# Patient Record
Sex: MALE | Race: WHITE | URBAN - METROPOLITAN AREA
[De-identification: names, ages, dates, MRNs, and addresses within clinical notes are randomized per-mention and may not be internally consistent; named-entity substitution may affect disease eponyms.]

---

## 2017-01-17 ENCOUNTER — OFFICE VISIT (OUTPATIENT)
Dept: NEUROLOGY | Facility: CLINIC | Age: 72
End: 2017-01-17
Payer: COMMERCIAL

## 2017-01-17 VITALS
OXYGEN SATURATION: 97 % | WEIGHT: 205 LBS | HEART RATE: 53 BPM | SYSTOLIC BLOOD PRESSURE: 143 MMHG | BODY MASS INDEX: 27.05 KG/M2 | DIASTOLIC BLOOD PRESSURE: 84 MMHG

## 2017-01-17 DIAGNOSIS — G60.3 IDIOPATHIC PROGRESSIVE POLYNEUROPATHY: ICD-10-CM

## 2017-01-17 DIAGNOSIS — M79.2 NEUROPATHIC PAIN: Primary | ICD-10-CM

## 2017-01-17 PROCEDURE — 99213 OFFICE O/P EST LOW 20 MIN: CPT | Performed by: PSYCHIATRY & NEUROLOGY

## 2017-01-17 ASSESSMENT — PAIN SCALES - GENERAL: PAINLEVEL: MILD PAIN (2)

## 2017-01-17 NOTE — PATIENT INSTRUCTIONS
Thank you for choosing [facility]. It was a pleasure to see you for your office visit today.     The following is a summary of your office visit:    Medication started today: Nortriptyline 10mg one tab for one week then increase to 2 tabs.     Medication stopped today:NA    Medication dose change:NA    Appointments Made today:Follow up 1 month    Nurse/clinic contact information:Brunilda [phone]    Further instructions for your care:  Watch for Side effects such as dry mouth, lightheadedness, ect.  Call as needed        If you had any blood work, imaging or other test we will be in touch regarding the results. If there is anything abnormal you will be contacted by phone and if the results are normal you will receive a letter in the mail. If you have any questions or concerns please contact us at [phone].

## 2017-01-17 NOTE — MR AVS SNAPSHOT
After Visit Summary   1/17/2017    Agnus Oliver    MRN: [id number]           Patient Information     Date Of Birth          1945        Visit Information        Provider Department      1/17/2017 11:00 AM Sterling Rodriguez MD [facility]        Care Instructions    Thank you for choosing [facility]. It was a pleasure to see you for your office visit today.     The following is a summary of your office visit:    Medication started today: Nortriptyline 10mg one tab for one week then increase to 2 tabs.     Medication stopped today:NA    Medication dose change:NA    Appointments Made today:Follow up 1 month    Nurse/clinic contact information:Brunilda [phone]    Further instructions for your care:  Watch for Side effects such as dry mouth, lightheadedness, ect.  Call as needed        If you had any blood work, imaging or other test we will be in touch regarding the results. If there is anything abnormal you will be contacted by phone and if the results are normal you will receive a letter in the mail. If you have any questions or concerns please contact us at [phone].                 Follow-ups after your visit        Your next 10 appointments already scheduled     Feb 28, 2017  9:30 AM   Return Visit with Sterling Rodriguez MD   [facility]    [address] [place] [address] [phone]              Who to contact     If you have questions or need follow up information about today's clinic visit or your schedule please contact [facility] directly at [phone].  Normal or non-critical lab and imaging results will be communicated to you by MyChart, letter or phone within 4 business days after the clinic has received the results. If you do not hear from us within 7 days, please contact the clinic through MyChart or phone. If you have a critical or abnormal lab result, we  will notify you by phone as soon as possible.  Submit refill requests through [employer] or call your pharmacy and they will forward the refill request to us. Please allow 3 business days for your refill to be completed.          Additional Information About Your Visit        [employer] Information     [employer] is an electronic gateway that provides easy, online access to your medical records. With [employer], you can request a clinic appointment, read your test results, renew a prescription or communicate with your care team.     To sign up for [employer] visit the website at [email or web address]   You will be asked to enter the access code listed below, as well as some personal information. Please follow the directions to create your username and password.     Your access code is: [email or web address]  Expires: 2017 12:07 PM     Your access code will  in 90 days. If you need help or a new code, please contact your [facility] or call [phone] for assistance.        Care EveryWhere ID     This is your Care EveryWhere ID. This could be used by other organizations to access your [facility] medical records  [id number]        Your Vitals Were     Pulse Pulse Oximetry                53 97%           Blood Pressure from Last 3 Encounters:   17 143/84   11/15/16 121/77   16 132/75    Weight from Last 3 Encounters:   17 92.987 kg (205 lb)   11/15/16 90.22 kg (198 lb 14.4 oz)   16 90.629 kg (199 lb 12.8 oz)              Today, you had the following     No orders found for display         Today's Medication Changes          These changes are accurate as of: 17 11:40 AM.  If you have any questions, ask your nurse or doctor.               These medicines have changed or have updated prescriptions.        Dose/Directions    gabapentin 300 MG capsule   Commonly known as:  NEURONTIN   This may have changed:  additional instructions   Used for:  Idiopathic progressive  polyneuropathy        Take 1 capsule in the AM, 2 capsules in the afternoon and 2 capsules in the evening.   Quantity:  150 capsule   Refills:  1                Primary Care Provider Office Phone # Fax #    Margaret Siegel [phone]       [facility] [address]  [facility] [address]        Thank you!     Thank you for choosing [facility]  for your care. Our goal is always to provide you with excellent care. Hearing back from our patients is one way we can continue to improve our services. Please take a few minutes to complete the written survey that you may receive in the mail after your visit with us. Thank you!             Your Updated Medication List - Protect others around you: Learn how to safely use, store and throw away your medicines at www.disposemymeds.org.          This list is accurate as of: 1/17/17 11:40 AM.  Always use your most recent med list.                   Brand Name Dispense Instructions for use    ascorbic acid 500 MG tablet    VITAMIN C     Take 500 mg by mouth daily.       EXCEDRIN PO      Take by mouth as needed       FISH OIL PO      Take 1 tablet by mouth daily. Capsules. Dosage not documented.       gabapentin 300 MG capsule    NEURONTIN    150 capsule    Take 1 capsule in the AM, 2 capsules in the afternoon and 2 capsules in the evening.       IBU-200 PO      Take 2 tablets by mouth as needed.       MULTIvitamin  S Caps      Take 1 tablet by mouth daily.       traMADol 50 MG tablet    ULTRAM    360 tablet    Take 1 tablet in the morning, 1 in the afternoon and 2 in the evening.       vitamin E 400 UNIT capsule      Take 1 capsule by mouth daily.

## 2017-01-17 NOTE — NURSING NOTE
"Angus Oliver's goals for this visit include: return  He requests these members of his care team be copied on today's visit information:     PCP: Margaret Siegel    Referring Provider:  No referring provider defined for this encounter.    Chief Complaint   Patient presents with     RECHECK       Initial /84 mmHg  Pulse 53  Wt 92.987 kg (205 lb)  SpO2 97% Estimated body mass index is 27.05 kg/(m^2) as calculated from the following:    Height as of 11/15/16: 1.854 m (6' 1\").    Weight as of this encounter: 92.987 kg (205 lb).  BP completed using cuff size: large    Do you need any medication refills at today's visit? y  "

## 2017-01-17 NOTE — Clinical Note
January 17, 2017      RE: Angus Oliver  [address] [place]  [place] [address]      No notes on file    Sterling Rodriguez MD

## 2017-01-18 NOTE — PROGRESS NOTES
2017            [place] [address]      RE:  Angus Oliver   MRN:  [id number]   :  1945      Dear Dr. Siegel:      I saw Angus Oliver in followup at the [facility] where I have seen him previously for management of an idiopathic sensory motor polyneuropathy.  Mr. Oliver notices a greater degree of subjective stiffness in the lower limbs and burning in the lower limbs in the evening.  He also is concerned that there might be a modest increase in numbness in all fingers of both digits.  He has no other new symptoms.  Increasing his gabapentin did not make any obvious benefit in his symptoms.      PHYSICAL EXAMINATION:  Speech, language and affect are normal.  Perception of light touch is preserved.  Pinprick perception is reduced over the distal half of the right foot and is otherwise generally preserved.  Vibration scores are 4-5 at the patellae, 0 at the toes and malleoli and 6 at the right index finger.  He is areflexic.  Manual muscle testing demonstrates trace weakness of right FDI with otherwise full strength.  There is mild symmetric atrophy of intrinsic foot muscles with mildly high arches.  He walks independently.  There are no parkinsonian features in his gait.  There is not an increase in tone and he is not retropulsive.      ASSESSMENT AND PLAN:  Mr. Oliver's examination demonstrates no substantial changes.  We discussed the option of further laboratory screening for causes of sensory deficits, but this has been negative in the past and likely the yield is low.  He prefers not to undergo further laboratory testing.      We spent some time reviewing his medication history.  He may have been least symptomatic when on tramadol 200 mg total per day, nortriptyline 25 mg at bedtime and a low dose of gabapentin.  We have decided to reinstitute nortriptyline.   The dosage would be quite low and the risk of clinically symptomatic serotonin excess is exceedingly modest, particularly given the fact that he has tolerated these doses in the past.  I will start him on 10 mg in the evening and have him go to 20 mg after 1 week if tolerated.  We reviewed adverse effects to monitor on this medication.  Should he do well on this regimen, we will begin to taper gabapentin over time.         Sincerely,      MARIBELL CUADRA MD             D: 2017 11:36   T: 2017 12:43   MT:       Name:     MANPREET FELDMAN   MRN:      [id number]        Account:      [id number]   :      1945      Document: [id number]       cc: Margaret Siegel MD

## 2017-01-27 ENCOUNTER — TELEPHONE (OUTPATIENT)
Dept: NEUROLOGY | Facility: CLINIC | Age: 72
End: 2017-01-27

## 2017-01-27 DIAGNOSIS — G60.3 IDIOPATHIC PROGRESSIVE POLYNEUROPATHY: Primary | ICD-10-CM

## 2017-01-27 RX ORDER — NORTRIPTYLINE HCL 10 MG
CAPSULE ORAL
Qty: 60 CAPSULE | Refills: 0 | Status: SHIPPED | OUTPATIENT
Start: 2017-01-27 | End: 2017-02-28

## 2017-01-27 NOTE — TELEPHONE ENCOUNTER
Per the notes stated in Dr Rodriguez's last office visit note, the prescription is pending in  for Dr Rodriguez to review and sign.   Brunilda Ledesma RN

## 2017-01-27 NOTE — TELEPHONE ENCOUNTER
[facility] Call Center    Phone Message    Name of Caller: Angus    Phone Number: Home number on file [phone] (home)    Best time to return call: Any    May a detailed message be left on voicemail: yes    Relation to patient: Self    Reason for Call: Angus called and said after his last visit he was supposed to receive a prescription for Nortriptyline.  He said he has checked with the pharmacy and they have not received a prescription.  Preferred Pharmacy: [facility]  Fax: [phone].      Action Taken: Message routed to:  Adult Clinics: Neurology p [id number]

## 2017-02-03 RX ORDER — NORTRIPTYLINE HCL 10 MG
CAPSULE ORAL
Qty: 180 CAPSULE | Refills: 0 | Status: CANCELLED | OUTPATIENT
Start: 2017-02-03

## 2017-02-03 NOTE — TELEPHONE ENCOUNTER
Since we are titrating the dose upward and do not yet know what dose will be best, perhaps we should hold off until we know more.

## 2017-02-03 NOTE — TELEPHONE ENCOUNTER
Notification received from [facility] requesting a 3 month supply of the Nortriptyline. Pending RX routed to Dr Rodriguez to review and sign.  Brunilda Ledesma RN

## 2017-02-27 NOTE — TELEPHONE ENCOUNTER
The pt was informed of Dr Rodriguez's response below. He stated that he doesn't feel this is helping very much and will discuss with Dr Rodriguez tomorrow in the office visit.  Brunilda Ledesma RN

## 2017-02-28 ENCOUNTER — OFFICE VISIT (OUTPATIENT)
Dept: NEUROLOGY | Facility: CLINIC | Age: 72
End: 2017-02-28
Payer: COMMERCIAL

## 2017-02-28 VITALS
BODY MASS INDEX: 27.86 KG/M2 | SYSTOLIC BLOOD PRESSURE: 128 MMHG | DIASTOLIC BLOOD PRESSURE: 81 MMHG | HEART RATE: 60 BPM | OXYGEN SATURATION: 97 % | WEIGHT: 211.2 LBS

## 2017-02-28 DIAGNOSIS — Z13.1 ENCOUNTER FOR SCREENING FOR DIABETES MELLITUS: ICD-10-CM

## 2017-02-28 DIAGNOSIS — G62.9 POLYNEUROPATHY: ICD-10-CM

## 2017-02-28 DIAGNOSIS — G60.3 IDIOPATHIC PROGRESSIVE POLYNEUROPATHY: ICD-10-CM

## 2017-02-28 PROCEDURE — 99212 OFFICE O/P EST SF 10 MIN: CPT | Performed by: PSYCHIATRY & NEUROLOGY

## 2017-02-28 RX ORDER — LIDOCAINE 50 MG/G
PATCH TOPICAL
Qty: 30 PATCH | Refills: 0 | Status: SHIPPED | OUTPATIENT
Start: 2017-02-28 | End: 2017-04-18

## 2017-02-28 RX ORDER — NORTRIPTYLINE HCL 25 MG
CAPSULE ORAL
Qty: 60 CAPSULE | Refills: 3 | Status: SHIPPED
Start: 2017-02-28 | End: 2017-05-24

## 2017-02-28 RX ORDER — GABAPENTIN 300 MG/1
600 CAPSULE ORAL 3 TIMES DAILY
Qty: 180 CAPSULE | Refills: 3 | Status: SHIPPED | OUTPATIENT
Start: 2017-02-28 | End: 2017-02-28

## 2017-02-28 RX ORDER — GABAPENTIN 300 MG/1
600 CAPSULE ORAL 3 TIMES DAILY
Qty: 540 CAPSULE | Refills: 0 | Status: SHIPPED | OUTPATIENT
Start: 2017-02-28 | End: 2017-04-18

## 2017-02-28 ASSESSMENT — PAIN SCALES - GENERAL: PAINLEVEL: MILD PAIN (2)

## 2017-02-28 NOTE — NURSING NOTE
"Angus Oliver's goals for this visit include: return  He requests these members of his care team be copied on today's visit information:     PCP: Margaret Siegel    Referring Provider:  No referring provider defined for this encounter.    Chief Complaint   Patient presents with     RECHECK       Initial /81 (BP Location: Left arm, Patient Position: Chair, Cuff Size: Adult Large)  Pulse 60  Wt 95.8 kg (211 lb 3.2 oz)  SpO2 97%  BMI 27.86 kg/m2 Estimated body mass index is 27.86 kg/(m^2) as calculated from the following:    Height as of 11/15/16: 1.854 m (6' 1\").    Weight as of this encounter: 95.8 kg (211 lb 3.2 oz).  Medication Reconciliation: complete    Do you need any medication refills at today's visit? y  "

## 2017-02-28 NOTE — LETTER
2017      RE: Angus Feldman  [address] [facility] [place] [address]      [id number]    2017            Margaret Siegel MD   [address] [address]      RE:  Angus Feldman   MRN:  [id number]   :  1945      Dear Dr. Siegel:      I saw Angus Feldman in followup at the [facility], where I have seen him for evaluation of an idiopathic painful neuropathy.  He reports modest benefit on nortriptyline without adverse effects.  We will increase his dose to 25 mg for 1 week and then 50 mg if tolerated.  I encouraged him to reduce his tramadol dose if this provides added benefit.  We discussed adverse effects of nortriptyline, as well as symptoms of serotonin syndrome to watch for.  I explained that he should reduce dose and call should he develop any of these symptoms.  I will start him on Lidoderm patches to be used as needed at night as well.  He will return in 4-6 months.         Sincerely,      MARIBELL CUADRA MD             D: 2017 11:13   T: 2017 13:33   MT:       Name:     ANGUS FELDMAN   MRN:      [id number]        Account:      [id number]   :      1945      Document: [id number]       cc: Margaret Cuadra MD

## 2017-02-28 NOTE — MR AVS SNAPSHOT
After Visit Summary   2/28/2017    Angus Oliver    MRN: [id number]           Patient Information     Date Of Birth          1945        Visit Information        Provider Department      2/28/2017 9:30 AM Sterling Rodriguez MD [facility]        Today's Diagnoses     Idiopathic progressive polyneuropathy        Polyneuropathy (H)         Encounter for screening for diabetes mellitus           Care Instructions    Thank you for choosing [facility] in [place]. It was a pleasure to see you for your office visit today.     The following is a summary of your office visit:    Medication started today: Lidoderm Patches, apply to painful areas once daily. You may keep them on for up to 12 hours but need to be off for 12 hours. Apply to intact skin only.     Medication stopped today: None    Medication dose change: Nortriptyline, take 1 at bedtime for 1 week, then 2 at bedtime. Reduce dose and call for dizziness, drowsiness, tremor, anxiety, or other new adverse effects.    Appointments Made today: A 6 week follow up with Dr Rodriguez was made for you.     Nurse/clinic contact information: [facility] [phone]    Further instructions for your care:   1. Call if your symptoms change or worsen.   2. Have the glucose tolerance test completed at your local clinic.        Follow-ups after your visit        Your next 10 appointments already scheduled     Apr 18, 2017  2:00 PM CDT   Return Visit with Sterling Rodriguez MD   [facility] ([facility]    [address] [place] [address] [phone]              Future tests that were ordered for you today     Open Future Orders        Priority Expected Expires Ordered    Glucose tolerance std non preg Routine  2/28/2018 2/28/2017            Who to contact     If you have questions or need follow up information about today's clinic visit or your schedule please  contact [facility] directly at [phone].  Normal or non-critical lab and imaging results will be communicated to you by MyChart, letter or phone within 4 business days after the clinic has received the results. If you do not hear from us within 7 days, please contact the clinic through MyChart or phone. If you have a critical or abnormal lab result, we will notify you by phone as soon as possible.  Submit refill requests through [employer] or call your pharmacy and they will forward the refill request to us. Please allow 3 business days for your refill to be completed.          Additional Information About Your Visit        [employer] Information     [employer] is an electronic gateway that provides easy, online access to your medical records. With [employer], you can request a clinic appointment, read your test results, renew a prescription or communicate with your care team.     To sign up for [employer] visit the website at [email or web address]   You will be asked to enter the access code listed below, as well as some personal information. Please follow the directions to create your username and password.     Your access code is: [email or web address]  Expires: 2017 11:22 AM     Your access code will  in 90 days. If you need help or a new code, please contact your [facility] or call [phone] for assistance.        Care EveryWhere ID     This is your Care EveryWhere ID. This could be used by other organizations to access your [facility] medical records  [id number]        Your Vitals Were     Pulse Pulse Oximetry BMI (Body Mass Index)             60 97% 27.86 kg/m2          Blood Pressure from Last 3 Encounters:   17 128/81   17 143/84   11/15/16 121/77    Weight from Last 3 Encounters:   17 211 lb 3.2 oz (95.8 kg)   17 205 lb (93 kg)   11/15/16 198 lb 14.4 oz (90.2 kg)                 Today's Medication Changes          These changes are  accurate as of: 2/28/17 11:22 AM.  If you have any questions, ask your nurse or doctor.               Start taking these medicines.        Dose/Directions    lidocaine 5 % Patch   Commonly known as:  LIDODERM   Used for:  Idiopathic progressive polyneuropathy   Started by:  Sterling Rodriguez MD        Apply 1 patch to each foot nightly.   Quantity:  30 patch   Refills:  0         These medicines have changed or have updated prescriptions.        Dose/Directions    * gabapentin 300 MG capsule   Commonly known as:  NEURONTIN   This may have changed:  additional instructions   Used for:  Idiopathic progressive polyneuropathy   Changed by:  Sterling Rodriguez MD        Take 1 capsule in the AM, 2 capsules in the afternoon and 2 capsules in the evening.   Quantity:  150 capsule   Refills:  1       * gabapentin 300 MG capsule   Commonly known as:  NEURONTIN   This may have changed:  You were already taking a medication with the same name, and this prescription was added. Make sure you understand how and when to take each.   Used for:  Idiopathic progressive polyneuropathy   Changed by:  Sterling Rodriguez MD        Dose:  600 mg   Take 2 capsules (600 mg) by mouth 3 times daily   Quantity:  180 capsule   Refills:  3       * nortriptyline 10 MG capsule   Commonly known as:  PAMELOR   This may have changed:    - how much to take  - how to take this  - when to take this  - additional instructions   Used for:  Idiopathic progressive polyneuropathy   Changed by:  Sterling Rodriguez MD        Start by taking 1 tablet by mouth every evening for 2 weeks and then if tolerated increase to 2 tabs in the evening   Quantity:  60 capsule   Refills:  0       * nortriptyline 25 MG capsule   Commonly known as:  PAMELOR   This may have changed:  You were already taking a medication with the same name, and this prescription was added. Make sure you understand how and when to take each.   Used for:  Idiopathic progressive polyneuropathy   Changed by:  Sterling Rodriguez  MD        Take 1 at bedtime for 1 week, then 2 at bedtime. Reduce dose and call for dizziness, drowsiness, tremor, anxiety, or other new adverse effects.   Quantity:  60 capsule   Refills:  3       * Notice:  This list has 4 medication(s) that are the same as other medications prescribed for you. Read the directions carefully, and ask your doctor or other care provider to review them with you.         Where to get your medicines      These medications were sent to [facility] - [place], [place] - [address] [facility]  [address] [facility], [place] [address]     Phone:  [phone]     gabapentin 300 MG capsule    nortriptyline 25 MG capsule         Call your pharmacy to confirm that your medication is ready for pickup. It may take up to 24 hours for them to receive the prescription. If the prescription is not ready within 3 business days, please contact your clinic or your provider.     We will let you know when these medications are ready. If you don't hear back within 3 business days, please contact us.     lidocaine 5 % Patch                Primary Care Provider Office Phone # Fax #    Margaret Siegel [phone]       [address] [address]        Thank you!     Thank you for choosing [facility]  for your care. Our goal is always to provide you with excellent care. Hearing back from our patients is one way we can continue to improve our services. Please take a few minutes to complete the written survey that you may receive in the mail after your visit with us. Thank you!             Your Updated Medication List - Protect others around you: Learn how to safely use, store and throw away your medicines at www.disposemymeds.org.          This list is accurate as of: 2/28/17 11:22 AM.  Always use your most recent med list.                   Brand Name Dispense Instructions for use    ascorbic acid 500 MG tablet    VITAMIN C      Take 500 mg by mouth daily.       EXCEDRIN PO      Take by mouth as needed       FISH OIL PO      Take 1 tablet by mouth daily. Capsules. Dosage not documented.       * gabapentin 300 MG capsule    NEURONTIN    150 capsule    Take 1 capsule in the AM, 2 capsules in the afternoon and 2 capsules in the evening.       * gabapentin 300 MG capsule    NEURONTIN    180 capsule    Take 2 capsules (600 mg) by mouth 3 times daily       IBU-200 PO      Take 2 tablets by mouth as needed.       lidocaine 5 % Patch    LIDODERM    30 patch    Apply 1 patch to each foot nightly.       MULTIvitamin  S Caps      Take 1 tablet by mouth daily.       * nortriptyline 10 MG capsule    PAMELOR    60 capsule    Start by taking 1 tablet by mouth every evening for 2 weeks and then if tolerated increase to 2 tabs in the evening       * nortriptyline 25 MG capsule    PAMELOR    60 capsule    Take 1 at bedtime for 1 week, then 2 at bedtime. Reduce dose and call for dizziness, drowsiness, tremor, anxiety, or other new adverse effects.       traMADol 50 MG tablet    ULTRAM    360 tablet    Take 1 tablet in the morning, 1 in the afternoon and 2 in the evening.       vitamin E 400 UNIT capsule      Take 1 capsule by mouth daily.       * Notice:  This list has 4 medication(s) that are the same as other medications prescribed for you. Read the directions carefully, and ask your doctor or other care provider to review them with you.

## 2017-02-28 NOTE — PATIENT INSTRUCTIONS
Thank you for choosing [facility] in [place]. It was a pleasure to see you for your office visit today.     The following is a summary of your office visit:    Medication started today: Lidoderm Patches, apply to painful areas once daily. You may keep them on for up to 12 hours but need to be off for 12 hours. Apply to intact skin only.     Medication stopped today: None    Medication dose change: Nortriptyline, take 1 at bedtime for 1 week, then 2 at bedtime. Reduce dose and call for dizziness, drowsiness, tremor, anxiety, or other new adverse effects.    Appointments Made today: A 6 week follow up with Dr Rodriguez was made for you.     Nurse/clinic contact information: [facility] [phone]    Further instructions for your care:   1. Call if your symptoms change or worsen.   2. Have the glucose tolerance test completed at your local clinic.

## 2017-02-28 NOTE — PROGRESS NOTES
2017            [place] [address]      RE:  Angus Oliver   MRN:  [id number]   :  1945      Dear Dr. Siegel:      I saw Angus Oliver in followup at the [facility], where I have seen him for evaluation of an idiopathic painful neuropathy.  He reports modest benefit on nortriptyline without adverse effects.  We will increase his dose to 25 mg for 1 week and then 50 mg if tolerated.  I encouraged him to reduce his tramadol dose if this provides added benefit.  We discussed adverse effects of nortriptyline, as well as symptoms of serotonin syndrome to watch for.  I explained that he should reduce dose and call should he develop any of these symptoms.  I will start him on Lidoderm patches to be used as needed at night as well.  He will return in 4-6 months.         Sincerely,      MARIBELL CUADRA MD             D: 2017 11:13   T: 2017 13:33   MT:       Name:     ANGUS OLIVER   MRN:      [id number]        Account:      [id number]   :      1945      Document: [id number]       cc: Margaret Siegel MD

## 2017-03-09 DIAGNOSIS — G60.3 IDIOPATHIC PROGRESSIVE POLYNEUROPATHY: Primary | ICD-10-CM

## 2017-04-18 ENCOUNTER — OFFICE VISIT (OUTPATIENT)
Dept: NEUROLOGY | Facility: CLINIC | Age: 72
End: 2017-04-18
Payer: COMMERCIAL

## 2017-04-18 VITALS
BODY MASS INDEX: 27.01 KG/M2 | DIASTOLIC BLOOD PRESSURE: 83 MMHG | OXYGEN SATURATION: 97 % | WEIGHT: 204.7 LBS | SYSTOLIC BLOOD PRESSURE: 135 MMHG | HEART RATE: 59 BPM

## 2017-04-18 DIAGNOSIS — M79.2 NEUROPATHIC PAIN: ICD-10-CM

## 2017-04-18 DIAGNOSIS — G60.3 IDIOPATHIC PROGRESSIVE POLYNEUROPATHY: ICD-10-CM

## 2017-04-18 PROCEDURE — 99212 OFFICE O/P EST SF 10 MIN: CPT | Performed by: PSYCHIATRY & NEUROLOGY

## 2017-04-18 RX ORDER — GABAPENTIN 300 MG/1
600 CAPSULE ORAL 3 TIMES DAILY
Qty: 540 CAPSULE | Refills: 0 | Status: SHIPPED | OUTPATIENT
Start: 2017-04-18 | End: 2017-08-25

## 2017-04-18 RX ORDER — TRAMADOL HYDROCHLORIDE 50 MG/1
TABLET ORAL
Qty: 360 TABLET | Refills: 1 | Status: SHIPPED | OUTPATIENT
Start: 2017-04-18 | End: 2017-09-18

## 2017-04-18 ASSESSMENT — PAIN SCALES - GENERAL: PAINLEVEL: MODERATE PAIN (4)

## 2017-04-18 NOTE — MR AVS SNAPSHOT
After Visit Summary   4/18/2017    Angus Oliver    MRN: [id number]           Patient Information     Date Of Birth          1945        Visit Information        Provider Department      4/18/2017 2:00 PM Sterling Rodriguez MD [facility]        Today's Diagnoses     Idiopathic progressive polyneuropathy        Neuropathic pain          Care Instructions    Thank you for choosing [facility]. It was a pleasure to see you for your office visit today.     The following is a summary of your office visit:    Medication started today: NA    Medication stopped today:NA    Medication dose change:NA    Appointments Made today:4 month follow up    Nurse/clinic contact information:Brunilda [phone]    Further instructions for your care:call as needed        If you had any blood work, imaging or other test we will be in touch regarding the results. If there is anything abnormal you will be contacted by phone and if the results are normal you will receive a letter in the mail. If you have any questions or concerns please contact us at [phone].                 Follow-ups after your visit        Your next 10 appointments already scheduled     Aug 29, 2017 11:30 AM CDT   Return Visit with Sterling Rodriguez MD   [facility] ([facility])    [address] [place] [address] [phone]              Who to contact     If you have questions or need follow up information about today's clinic visit or your schedule please contact [facility] directly at [phone].  Normal or non-critical lab and imaging results will be communicated to you by MyChart, letter or phone within 4 business days after the clinic has received the results. If you do not hear from us within 7 days, please contact the clinic through MyChart or phone. If you have a critical or abnormal lab result, we will notify you by phone as soon as  possible.  Submit refill requests through GetIntent or call your pharmacy and they will forward the refill request to us. Please allow 3 business days for your refill to be completed.          Additional Information About Your Visit        GetIntent Information     GetIntent is an electronic gateway that provides easy, online access to your medical records. With GetIntent, you can request a clinic appointment, read your test results, renew a prescription or communicate with your care team.     To sign up for GetIntent visit the website at [email or web address]   You will be asked to enter the access code listed below, as well as some personal information. Please follow the directions to create your username and password.     Your access code is: [email or web address]  Expires: 2017 12:22 PM     Your access code will  in 90 days. If you need help or a new code, please contact your [facility] or call [phone] for assistance.        Care EveryWhere ID     This is your Care EveryWhere ID. This could be used by other organizations to access your [place] medical records  [id number]        Your Vitals Were     Pulse Pulse Oximetry BMI (Body Mass Index)             59 97% 27.01 kg/m2          Blood Pressure from Last 3 Encounters:   17 135/83   17 128/81   17 143/84    Weight from Last 3 Encounters:   17 92.9 kg (204 lb 11.2 oz)   17 95.8 kg (211 lb 3.2 oz)   17 93 kg (205 lb)              Today, you had the following     No orders found for display         Today's Medication Changes          These changes are accurate as of: 17  2:29 PM.  If you have any questions, ask your nurse or doctor.               These medicines have changed or have updated prescriptions.        Dose/Directions    nortriptyline 25 MG capsule   Commonly known as:  PAMELOR   This may have changed:    - how much to take  - how to take this  - when to take this  - additional  instructions   Used for:  Idiopathic progressive polyneuropathy        Take 1 at bedtime for 1 week, then 2 at bedtime. Reduce dose and call for dizziness, drowsiness, tremor, anxiety, or other new adverse effects.   Quantity:  60 capsule   Refills:  3            Where to get your medicines      These medications were sent to [facility] - [place], [place] - [address]  [address] [facility] [address], [place] [address]     Phone:  [phone]     gabapentin 300 MG capsule         Some of these will need a paper prescription and others can be bought over the counter.  Ask your nurse if you have questions.     Bring a paper prescription for each of these medications     traMADol 50 MG tablet                Primary Care Provider Office Phone # Fax #    Margaret Siegel [phone]       [facility] [address]  [facility] [address]        Thank you!     Thank you for choosing [facility]  for your care. Our goal is always to provide you with excellent care. Hearing back from our patients is one way we can continue to improve our services. Please take a few minutes to complete the written survey that you may receive in the mail after your visit with us. Thank you!             Your Updated Medication List - Protect others around you: Learn how to safely use, store and throw away your medicines at www.disposemymeds.org.          This list is accurate as of: 4/18/17  2:29 PM.  Always use your most recent med list.                   Brand Name Dispense Instructions for use    ascorbic acid 500 MG tablet    VITAMIN C     Take 500 mg by mouth daily.       EXCEDRIN PO      Take by mouth as needed       FISH OIL PO      Take 1 tablet by mouth daily. Capsules. Dosage not documented.       gabapentin 300 MG capsule    NEURONTIN    540 capsule    Take 2 capsules (600 mg) by mouth 3 times daily       IBU-200 PO      Take 2 tablets by mouth as needed.        Lidocaine-Menthol 4-5 % Ptch      OTC prn       MULTIvitamin  S Caps      Take 1 tablet by mouth daily.       nortriptyline 25 MG capsule    PAMELOR    60 capsule    Take 1 at bedtime for 1 week, then 2 at bedtime. Reduce dose and call for dizziness, drowsiness, tremor, anxiety, or other new adverse effects.       traMADol 50 MG tablet    ULTRAM    360 tablet    Take 1 tablet in the morning, 1 in the afternoon and 2 in the evening.       vitamin E 400 UNIT capsule      Take 1 capsule by mouth daily.

## 2017-04-18 NOTE — PATIENT INSTRUCTIONS
Thank you for choosing [facility]. It was a pleasure to see you for your office visit today.     The following is a summary of your office visit:    Medication started today: NA    Medication stopped today:NA    Medication dose change:NA    Appointments Made today:4 month follow up    Nurse/clinic contact information:Brunilda [phone]    Further instructions for your care:call as needed        If you had any blood work, imaging or other test we will be in touch regarding the results. If there is anything abnormal you will be contacted by phone and if the results are normal you will receive a letter in the mail. If you have any questions or concerns please contact us at [phone].

## 2017-04-19 NOTE — PROGRESS NOTES
2017            Margaret Siegel MD   [facility]   [address] [place] [address]      RE:  Angus Oliver   MRN:  [id number]   :  1945      Dear Dr. Siegel:      I saw Angus Oliver in follow-up at the [facility] in [place] where I have seen him for management of an idiopathic painful neuropathy.  Mr. Oliver's disturbing symptoms in the feet have improved on nortriptyline 50 mg daily.  He does notice some reduced tactile perception in the hands which has affected fine motor control.      Examination:  Tone, bulk and strength are normal.  Reflexes are absent except for 1+ reflex at the right biceps.  Sensory examination reveals broadly preserved perception of touch, and equivocal reduction in pin over the foot dorsa.  Vibration scores are 6 at the patellae and 5 at the fingertips.  Position sense is preserved at the toes.  Gait is normal.      Mr. Oliver has a postural tremor which he indicates he has had for quite some time, including prior to initiation of nortriptyline.  He does not have inducible clonus, increased tone, or other features of serotonin excess.      I will make no changes to Mr. Oliver's medication regimen.  I suggested nerve conduction studies of the upper limbs to better assess his sensory deficits, and to evaluate for the possibility of a new entrapment neuropathy, but he declines.  I will see him in routine follow-up in 4 months or later, and will see him periodically just to monitor his clinical findings.         Sincerely,      MARIBELL CUADRA MD             D: 2017 14:25   T: 2017 03:49   MT: [id number]      Name:     ANGUS OLIVER   MRN:      [id number]        Account:      [id number]   :      1945      Document: [id number]       cc: Margaret Siegel MD

## 2017-05-24 DIAGNOSIS — G60.3 IDIOPATHIC PROGRESSIVE POLYNEUROPATHY: ICD-10-CM

## 2017-05-24 RX ORDER — NORTRIPTYLINE HCL 25 MG
CAPSULE ORAL
Qty: 60 CAPSULE | Refills: 3 | Status: SHIPPED | OUTPATIENT
Start: 2017-05-24 | End: 2017-08-25

## 2017-08-25 DIAGNOSIS — G60.3 IDIOPATHIC PROGRESSIVE POLYNEUROPATHY: ICD-10-CM

## 2017-08-25 RX ORDER — GABAPENTIN 300 MG/1
600 CAPSULE ORAL 3 TIMES DAILY
Qty: 540 CAPSULE | Refills: 1 | Status: SHIPPED | OUTPATIENT
Start: 2017-08-25 | End: 2018-03-20

## 2017-08-25 RX ORDER — NORTRIPTYLINE HCL 25 MG
CAPSULE ORAL
Qty: 60 CAPSULE | Refills: 1 | Status: SHIPPED | OUTPATIENT
Start: 2017-08-25 | End: 2017-09-07

## 2017-08-29 ENCOUNTER — OFFICE VISIT (OUTPATIENT)
Dept: NEUROLOGY | Facility: CLINIC | Age: 72
End: 2017-08-29
Payer: COMMERCIAL

## 2017-08-29 VITALS
HEART RATE: 60 BPM | BODY MASS INDEX: 26.9 KG/M2 | WEIGHT: 203 LBS | TEMPERATURE: 96.9 F | DIASTOLIC BLOOD PRESSURE: 80 MMHG | HEIGHT: 73 IN | SYSTOLIC BLOOD PRESSURE: 131 MMHG

## 2017-08-29 DIAGNOSIS — G60.3 IDIOPATHIC PROGRESSIVE POLYNEUROPATHY: Primary | ICD-10-CM

## 2017-08-29 PROCEDURE — 99212 OFFICE O/P EST SF 10 MIN: CPT | Performed by: PSYCHIATRY & NEUROLOGY

## 2017-08-29 ASSESSMENT — PAIN SCALES - GENERAL: PAINLEVEL: MILD PAIN (3)

## 2017-08-29 NOTE — LETTER
2017        RE: Manpreet Feldman  [address]  [place] [address]        [id number]    2017         Margaret Siegel MD   [facility]   [address] [place] [address]      RE:  Manpreet Feldman, MR# [id number],  1945      Dear Dr. Siegel,      I saw Manpreet Feldman in follow-up at the [facility] in [place] where I have seen him for management of an idiopathic painful sensory neuropathy.  Mr. Feldman notes no changes in his subjective symptoms, either positive or negative, and continues to feel the 50 mg of nortriptyline at bedtime has provided benefit in terms of his ability to sleep and, consequently, his pain.      A directed examination demonstrates vibration scores of 3-4 at the patellae.  Position sense is preserved at the right great toe.  Perception of light touch is within broad normal limits.  Strength is full.  Reflexes are absent and plantar responses are mute.  Pinprick perception is variably reduced in the calves and feet, with relative preservation of thermal perception.      Mr. Feldman's examination is stable compared with his last visit with the exception of a broader area of subjective pinprick sensory deficit.  We discussed this somewhat.  I am not confident that there has been a substantial change, particularly given the variability in the pinprick perception and the preserved thermal perception.  I will see him in follow-up in 4-6 months, and have encouraged him to contact me should he observe a subjective deterioration in a shorter interval, under which circumstances I might repeat his nerve conduction studies.         Sincerely,      MARIBELL CUADRA MD             D: 2017 11:59   T: 2017 00:43   MT: EM#[id number]      Name:     MANPREET FELDMAN   MRN:      [id number]        Account:      [id number]   :      1945      Document: [id number]       cc: Margaret  Christal EDMONDSON         Sincerely,        Sterling Rodriguez MD

## 2017-08-29 NOTE — MR AVS SNAPSHOT
After Visit Summary   2017    Angus Oliver    MRN: [id number]           Patient Information     Date Of Birth          1945        Visit Information        Provider Department      2017 11:30 AM Sterling Rodriguez MD [facility]         Follow-ups after your visit        Your next 10 appointments already scheduled     2018 11:30 AM CST   Return Visit with Sterling Rodriguez MD   [facility] ([facility])    [address] [place] [address] [phone]              Who to contact     If you have questions or need follow up information about today's clinic visit or your schedule please contact [facility] directly at [phone].  Normal or non-critical lab and imaging results will be communicated to you by MyChart, letter or phone within 4 business days after the clinic has received the results. If you do not hear from us within 7 days, please contact the clinic through MyChart or phone. If you have a critical or abnormal lab result, we will notify you by phone as soon as possible.  Submit refill requests through Razient or call your pharmacy and they will forward the refill request to us. Please allow 3 business days for your refill to be completed.          Additional Information About Your Visit        [employer] Information     Razient is an electronic gateway that provides easy, online access to your medical records. With Razient, you can request a clinic appointment, read your test results, renew a prescription or communicate with your care team.     To sign up for Razient visit the website at [email or web address]   You will be asked to enter the access code listed below, as well as some personal information. Please follow the directions to create your username and password.     Your access code is: [email or web address]  Expires: 2017 12:03 PM     Your access code will  in 90 days. If you  "need help or a new code, please contact your [facility] or call [phone] for assistance.        Care EveryWhere ID     This is your Care EveryWhere ID. This could be used by other organizations to access your [place] medical records  [id number]        Your Vitals Were     Pulse Temperature Height BMI (Body Mass Index)          60 96.9  F (36.1  C) (Oral) 1.854 m (6' 1\") 26.78 kg/m2         Blood Pressure from Last 3 Encounters:   08/29/17 131/80   04/18/17 135/83   02/28/17 128/81    Weight from Last 3 Encounters:   08/29/17 92.1 kg (203 lb)   04/18/17 92.9 kg (204 lb 11.2 oz)   02/28/17 95.8 kg (211 lb 3.2 oz)              Today, you had the following     No orders found for display       Primary Care Provider Office Phone # Fax #    Margaret DARIUS Christal [phone]       [address] [address]        Equal Access to Services     [facility] Center[facility] : Hadii aad ku hadasho Soomaali, waaxda luqadaha, qaybta kaalmada adeegyada, bang stark . So [facility] [phone].    ATENCIÓN: Si habla español, tiene a cheney disposición servicios gratuitos de asistencia lingüística. Adriano al [phone].    We comply with applicable federal civil rights laws and [place] laws. We do not discriminate on the basis of race, color, national origin, age, disability sex, sexual orientation or gender identity.            Thank you!     Thank you for choosing [facility]  for your care. Our goal is always to provide you with excellent care. Hearing back from our patients is one way we can continue to improve our services. Please take a few minutes to complete the written survey that you may receive in the mail after your visit with us. Thank you!             Your Updated Medication List - Protect others around you: Learn how to safely use, store and throw away your medicines at www.disposemymeds.org.          This list is " accurate as of: 8/29/17 12:03 PM.  Always use your most recent med list.                   Brand Name Dispense Instructions for use Diagnosis    ascorbic acid 500 MG tablet    VITAMIN C     Take 500 mg by mouth daily.        EXCEDRIN PO      Take by mouth as needed        FISH OIL PO      Take 1 tablet by mouth daily. Capsules. Dosage not documented.        gabapentin 300 MG capsule    NEURONTIN    540 capsule    Take 2 capsules (600 mg) by mouth 3 times daily    Idiopathic progressive polyneuropathy       IBU-200 PO      Take 2 tablets by mouth as needed.        Lidocaine-Menthol 4-5 % Ptch      OTC prn        MULTIvitamin  S Caps      Take 1 tablet by mouth daily.        nortriptyline 25 MG capsule    PAMELOR    60 capsule    2 tablets at bedtime. Reduce dose and call if you experience dizziness, drowsiness, tremor, anxiety, or other new adverse effects.    Idiopathic progressive polyneuropathy       traMADol 50 MG tablet    ULTRAM    360 tablet    Take 1 tablet in the morning, 1 in the afternoon and 2 in the evening.    Neuropathic pain       vitamin E 400 UNIT capsule      Take 1 capsule by mouth daily.

## 2017-08-29 NOTE — NURSING NOTE
"Angus Oliver's goals for this visit include: recheck   He requests these members of his care team be copied on today's visit information:     PCP: Margaret Siegel    Referring Provider:  No referring provider defined for this encounter.    Chief Complaint   Patient presents with     RECHECK      idiopathic painful neuropathy       Initial /80  Pulse 60  Temp 96.9  F (36.1  C) (Oral)  Ht 1.854 m (6' 1\")  Wt 92.1 kg (203 lb)  BMI 26.78 kg/m2 Estimated body mass index is 26.78 kg/(m^2) as calculated from the following:    Height as of this encounter: 1.854 m (6' 1\").    Weight as of this encounter: 92.1 kg (203 lb).  Medication Reconciliation: complete    "

## 2017-08-30 NOTE — PROGRESS NOTES
2017         Margaret Siegel MD   [facility]   [address] [place] [address]      RE:  Angus Oliver, MR# [id number],  1945      Dear Dr. Siegel,      I saw Angus Oliver in follow-up at the [facility] in [place] where I have seen him for management of an idiopathic painful sensory neuropathy.  Mr. Oliver notes no changes in his subjective symptoms, either positive or negative, and continues to feel the 50 mg of nortriptyline at bedtime has provided benefit in terms of his ability to sleep and, consequently, his pain.      A directed examination demonstrates vibration scores of 3-4 at the patellae.  Position sense is preserved at the right great toe.  Perception of light touch is within broad normal limits.  Strength is full.  Reflexes are absent and plantar responses are mute.  Pinprick perception is variably reduced in the calves and feet, with relative preservation of thermal perception.      Mr. Oliver's examination is stable compared with his last visit with the exception of a broader area of subjective pinprick sensory deficit.  We discussed this somewhat.  I am not confident that there has been a substantial change, particularly given the variability in the pinprick perception and the preserved thermal perception.  I will see him in follow-up in 4-6 months, and have encouraged him to contact me should he observe a subjective deterioration in a shorter interval, under which circumstances I might repeat his nerve conduction studies.         Sincerely,      MARIBELL CUADRA MD             D: 2017 11:59   T: 2017 00:43   MT: EM#[id number]      Name:     ANGUS OLIVER   MRN:      [id number]        Account:      [id number]   :      1945      Document: [id number]       cc: Margaret Siegel MD

## 2017-08-31 ENCOUNTER — TELEPHONE (OUTPATIENT)
Dept: NEUROLOGY | Facility: CLINIC | Age: 72
End: 2017-08-31

## 2017-08-31 DIAGNOSIS — G60.3 IDIOPATHIC PROGRESSIVE POLYNEUROPATHY: ICD-10-CM

## 2017-08-31 NOTE — TELEPHONE ENCOUNTER
Looking through pts history with nortriptyline (PAMELOR) 25 MG capsule he had been getting 60 tabs with 3 refills since Feb 20/17. Last office visit 8/29/17noted patient continues to feel the 50 mg of nortriptyline at bedtime has provided benefit in terms of his ability to sleep and, consequently, his pain and to f/u in 3-4 months for recheck.    Please advise if ok to approve pt's request.  Amy Morgan, CMA

## 2017-08-31 NOTE — TELEPHONE ENCOUNTER
[facility] Call Center    Phone Message    Name of Caller: Angus    Phone Number: Home number on file [phone] (home)    Best time to return call: any    May a detailed message be left on voicemail: yes    Relation to patient: Self    Reason for Call: Patient has question on RX: nortriptyline (PAMELOR) 25 MG capsule, should have ordered 3 month supply, instead of one month  Pharm: [facility]    Action Taken: Message routed to:  Adult Clinics: Neurology p [id number]

## 2017-09-07 RX ORDER — NORTRIPTYLINE HCL 25 MG
CAPSULE ORAL
Qty: 180 CAPSULE | Refills: 0 | Status: SHIPPED | OUTPATIENT
Start: 2017-09-07 | End: 2017-12-01

## 2017-09-07 NOTE — TELEPHONE ENCOUNTER
Dr Rodriguez agreed with giving him 90 days supply, RX sent to the pt's pharmacy.  Brunilda Ledesma RN

## 2017-09-18 DIAGNOSIS — M79.2 NEUROPATHIC PAIN: ICD-10-CM

## 2017-09-20 RX ORDER — TRAMADOL HYDROCHLORIDE 50 MG/1
TABLET ORAL
Qty: 360 TABLET | Refills: 1 | Status: SHIPPED | OUTPATIENT
Start: 2017-09-20 | End: 2018-03-20

## 2017-12-01 DIAGNOSIS — G60.3 IDIOPATHIC PROGRESSIVE POLYNEUROPATHY: ICD-10-CM

## 2017-12-01 RX ORDER — NORTRIPTYLINE HCL 25 MG
CAPSULE ORAL
Qty: 180 CAPSULE | Refills: 0 | Status: SHIPPED | OUTPATIENT
Start: 2017-12-01 | End: 2018-02-26

## 2018-02-26 DIAGNOSIS — G60.3 IDIOPATHIC PROGRESSIVE POLYNEUROPATHY: ICD-10-CM

## 2018-02-26 RX ORDER — NORTRIPTYLINE HCL 25 MG
CAPSULE ORAL
Qty: 180 CAPSULE | Refills: 1 | Status: SHIPPED | OUTPATIENT
Start: 2018-02-26 | End: 2018-03-20

## 2018-02-26 NOTE — TELEPHONE ENCOUNTER
Refill request for Nortriptyline received. Routed to Dr Rodriguez to review and sign pending RX in .   Brunilda Ledesma RN

## 2018-03-20 ENCOUNTER — OFFICE VISIT (OUTPATIENT)
Dept: NEUROLOGY | Facility: CLINIC | Age: 73
End: 2018-03-20
Payer: COMMERCIAL

## 2018-03-20 VITALS
BODY MASS INDEX: 26.99 KG/M2 | DIASTOLIC BLOOD PRESSURE: 88 MMHG | SYSTOLIC BLOOD PRESSURE: 159 MMHG | OXYGEN SATURATION: 97 % | WEIGHT: 204.6 LBS | HEART RATE: 65 BPM

## 2018-03-20 DIAGNOSIS — M79.2 NEUROPATHIC PAIN: ICD-10-CM

## 2018-03-20 DIAGNOSIS — G60.3 IDIOPATHIC PROGRESSIVE POLYNEUROPATHY: ICD-10-CM

## 2018-03-20 PROCEDURE — 99212 OFFICE O/P EST SF 10 MIN: CPT | Performed by: PSYCHIATRY & NEUROLOGY

## 2018-03-20 RX ORDER — TRAMADOL HYDROCHLORIDE 50 MG/1
TABLET ORAL
Qty: 360 TABLET | Refills: 1 | Status: SHIPPED | OUTPATIENT
Start: 2018-03-20 | End: 2018-03-21

## 2018-03-20 RX ORDER — NORTRIPTYLINE HCL 25 MG
CAPSULE ORAL
Qty: 180 CAPSULE | Refills: 1 | Status: SHIPPED | OUTPATIENT
Start: 2018-03-20 | End: 2018-11-29

## 2018-03-20 RX ORDER — GABAPENTIN 300 MG/1
600 CAPSULE ORAL 3 TIMES DAILY
Qty: 540 CAPSULE | Refills: 1 | Status: SHIPPED | OUTPATIENT
Start: 2018-03-20 | End: 2018-08-26

## 2018-03-20 ASSESSMENT — PAIN SCALES - GENERAL: PAINLEVEL: NO PAIN (0)

## 2018-03-20 NOTE — MR AVS SNAPSHOT
After Visit Summary   3/20/2018    Angus Oliver    MRN: [id number]           Patient Information     Date Of Birth          1945        Visit Information        Provider Department      3/20/2018 12:30 PM Sterling Rodriguez MD [facility]        Today's Diagnoses     Neuropathic pain        Idiopathic progressive polyneuropathy           Follow-ups after your visit        Follow-up notes from your care team     Return in about 6 months (around 9/20/2018).      Your next 10 appointments already scheduled     Sep 18, 2018 11:30 AM CDT   Return Visit with Sterling Rodriguez MD   [facility] ([facility])    [address] [place] [address] [phone]              Who to contact     If you have questions or need follow up information about today's clinic visit or your schedule please contact [facility] directly at [phone].  Normal or non-critical lab and imaging results will be communicated to you by MyChart, letter or phone within 4 business days after the clinic has received the results. If you do not hear from us within 7 days, please contact the clinic through [employer] or phone. If you have a critical or abnormal lab result, we will notify you by phone as soon as possible.  Submit refill requests through WhatSalon or call your pharmacy and they will forward the refill request to us. Please allow 3 business days for your refill to be completed.          Additional Information About Your Visit        MyChart Information     WhatSalon is an electronic gateway that provides easy, online access to your medical records. With WhatSalon, you can request a clinic appointment, read your test results, renew a prescription or communicate with your care team.     To sign up for WhatSalon visit the website at [email or web address]   You will be asked to enter the access code listed below, as well as some personal information.  Please follow the directions to create your username and password.     Your access code is: [email or web address]  Expires: 2018  2:28 PM     Your access code will  in 90 days. If you need help or a new code, please contact your [facility] or call [phone] for assistance.        Care EveryWhere ID     This is your Care EveryWhere ID. This could be used by other organizations to access your [place] medical records  [id number]        Your Vitals Were     Pulse Pulse Oximetry BMI (Body Mass Index)             65 97% 26.99 kg/m2          Blood Pressure from Last 3 Encounters:   18 159/88   17 131/80   17 135/83    Weight from Last 3 Encounters:   18 92.8 kg (204 lb 9.6 oz)   17 92.1 kg (203 lb)   17 92.9 kg (204 lb 11.2 oz)              Today, you had the following     No orders found for display         Where to get your medicines      These medications were sent to [facility] - [place], [place] - [address] [place] [address] 106  [address] [facility] [address], [facility] [address]     Phone:  [phone]     gabapentin 300 MG capsule    nortriptyline 25 MG capsule         Some of these will need a paper prescription and others can be bought over the counter.  Ask your nurse if you have questions.     Bring a paper prescription for each of these medications     traMADol 50 MG tablet          Primary Care Provider Office Phone # Fax #    Margaret Siegel [phone]       [facility] [address] [facility] [address]        Equal Access to Services     MARYJO DIAZ : Hadii tevin Wilkins, waaxda lupatricia, qaybta kaalbang meyers. So [facility] [phone].    ATENCIÓN: Si habla español, tiene a cheney disposición servicios gratuitos de asistencia lingüística. Llame al [phone].    We comply with applicable federal civil rights laws and [place] laws. We do not  discriminate on the basis of race, color, national origin, age, disability, sex, sexual orientation, or gender identity.            Thank you!     Thank you for choosing [facility]  for your care. Our goal is always to provide you with excellent care. Hearing back from our patients is one way we can continue to improve our services. Please take a few minutes to complete the written survey that you may receive in the mail after your visit with us. Thank you!             Your Updated Medication List - Protect others around you: Learn how to safely use, store and throw away your medicines at www.disposemymeds.org.          This list is accurate as of 3/20/18  2:28 PM.  Always use your most recent med list.                   Brand Name Dispense Instructions for use Diagnosis    ascorbic acid 500 MG tablet    VITAMIN C     Take 500 mg by mouth daily.        EXCEDRIN PO      Take by mouth as needed        FISH OIL PO      Take 1 tablet by mouth daily. Capsules. Dosage not documented.        gabapentin 300 MG capsule    NEURONTIN    540 capsule    Take 2 capsules (600 mg) by mouth 3 times daily    Idiopathic progressive polyneuropathy       IBU-200 PO      Take 2 tablets by mouth as needed.        Lidocaine-Menthol 4-5 % Ptch      OTC prn        MULTIvitamin  S Caps      Take 1 tablet by mouth daily.        nortriptyline 25 MG capsule    PAMELOR    180 capsule    2 tablets at bedtime. Reduce dose and call if you experience dizziness, drowsiness, tremor, anxiety, or other new adverse effects.    Idiopathic progressive polyneuropathy       traMADol 50 MG tablet    ULTRAM    360 tablet    Take 1 tablet in the morning, 1 in the afternoon and 2 in the evening.    Neuropathic pain       vitamin E 400 UNIT capsule      Take 1 capsule by mouth daily.

## 2018-03-20 NOTE — LETTER
3/20/2018         RE: Manpreet Feldman  [address]  [place] [address]        Dear Colleague,    Thank you for referring your patient, Manpreet Feldman, to the [facility]. Please see a copy of my visit note below.    [id number]    2018            Margaret Siegel MD   [address] [address]         Re:  Manpreet Feldman   :  1945   MRN:  [id number]         Dear Dr. Siegel:      I saw Manpreet Feldman in followup at the [facility] [place] today where I have seen him for an idiopathic painful neuropathy.  Mr. Feldman is symptomatically stable.      A directed examination demonstrates preserved perception of light touch distal to the distal calves initially; repeat testing indicates that he does have broadly preserved perception of light touch.  Pinprick perception is broadly reduced but thermal perception is preserved.  Vibration scores are 3 at the right malleolus and 5 at the left patella.  Strength is full.      Mr. Feldman examination is stable.  I have refilled his medications and will see him in followup in 6 months.         Sincerely,      MARIBELL CUADRA MD             D: 2018   T: 2018   MT: NTS      Name:     MANPREET FELDMAN   MRN:      [id number]        Account:      [id number]   :      1945      Document: [id number]       cc: Margaret Siegel MD       Again, thank you for allowing me to participate in the care of your patient.        Sincerely,        Maribell Cuadra MD

## 2018-03-20 NOTE — PROGRESS NOTES
2018            [place] [address]         Re:  Angus Oliver   :  1945   MRN:  [id number]         Dear Dr. Siegel:      I saw Angus Oliver in followup at the [facility] [place] today where I have seen him for an idiopathic painful neuropathy.  Mr. Oliver is symptomatically stable.      A directed examination demonstrates preserved perception of light touch distal to the distal calves initially; repeat testing indicates that he does have broadly preserved perception of light touch.  Pinprick perception is broadly reduced but thermal perception is preserved.  Vibration scores are 3 at the right malleolus and 5 at the left patella.  Strength is full.      Mr. Oliver examination is stable.  I have refilled his medications and will see him in followup in 6 months.         Sincerely,      MARIBELL CUADRA MD             D: 2018   T: 2018   MT: NTS      Name:     ANGUS OLIVER   MRN:      [id number]        Account:      [id number]   :      1945      Document: [id number]       cc: Margaret Siegel MD

## 2018-03-21 DIAGNOSIS — G60.3 IDIOPATHIC PROGRESSIVE POLYNEUROPATHY: ICD-10-CM

## 2018-03-21 DIAGNOSIS — M79.2 NEUROPATHIC PAIN: ICD-10-CM

## 2018-03-21 RX ORDER — TRAMADOL HYDROCHLORIDE 50 MG/1
TABLET ORAL
Qty: 360 TABLET | Refills: 1 | Status: SHIPPED | OUTPATIENT
Start: 2018-03-21 | End: 2018-08-30

## 2018-03-21 NOTE — TELEPHONE ENCOUNTER
Patient wants a 90 day refill or tramadol, Dr Rodriguez, using mail order pharmacy.    [facility] - [place], [place] - [address] [place] [address]

## 2018-03-21 NOTE — TELEPHONE ENCOUNTER
Dr Rodriguez signed the prescription so this RN called it into the [facility].  Brunilda Ledesma RN

## 2018-08-26 DIAGNOSIS — G60.3 IDIOPATHIC PROGRESSIVE POLYNEUROPATHY: ICD-10-CM

## 2018-08-27 RX ORDER — GABAPENTIN 300 MG/1
CAPSULE ORAL
Qty: 540 CAPSULE | Refills: 1 | Status: SHIPPED | OUTPATIENT
Start: 2018-08-27 | End: 2019-02-13

## 2018-08-27 NOTE — TELEPHONE ENCOUNTER
Chart reveals that this is an appropriate refill request, encounter routed to Dr Rodriguez to sign pending RX.  Brunilda Ledesma RN

## 2018-08-30 DIAGNOSIS — M79.2 NEUROPATHIC PAIN: ICD-10-CM

## 2018-08-30 RX ORDER — TRAMADOL HYDROCHLORIDE 50 MG/1
TABLET ORAL
Qty: 360 TABLET | Refills: 1 | Status: SHIPPED | OUTPATIENT
Start: 2018-08-30

## 2018-08-30 NOTE — TELEPHONE ENCOUNTER
Writer received a refill request from: [facility] mail order in [place].     Medication: Tramadol HCL  50mg    Date last written: 3/21/18  Dispensed amount: 360   Refills: 1  Date last dispensed: 6/10/18      Pt's last office visit: 3/20/18  Next scheduled office visit: 9/18/18      P

## 2018-08-31 ENCOUNTER — TELEPHONE (OUTPATIENT)
Dept: NEUROLOGY | Facility: CLINIC | Age: 73
End: 2018-08-31

## 2018-09-18 ENCOUNTER — OFFICE VISIT (OUTPATIENT)
Dept: NEUROLOGY | Facility: CLINIC | Age: 73
End: 2018-09-18
Payer: COMMERCIAL

## 2018-09-18 VITALS
BODY MASS INDEX: 26.51 KG/M2 | HEIGHT: 73 IN | DIASTOLIC BLOOD PRESSURE: 82 MMHG | SYSTOLIC BLOOD PRESSURE: 146 MMHG | OXYGEN SATURATION: 95 % | WEIGHT: 200 LBS | HEART RATE: 65 BPM

## 2018-09-18 DIAGNOSIS — G60.3 IDIOPATHIC PROGRESSIVE POLYNEUROPATHY: Primary | ICD-10-CM

## 2018-09-18 PROCEDURE — 99213 OFFICE O/P EST LOW 20 MIN: CPT | Performed by: PSYCHIATRY & NEUROLOGY

## 2018-09-18 NOTE — MR AVS SNAPSHOT
"              After Visit Summary   9/18/2018    Angus Oliver    MRN: [id number]           Patient Information     Date Of Birth          1945        Visit Information        Provider Department      9/18/2018 11:30 AM Sterling Rodriguez MD [facility]        Today's Diagnoses     Idiopathic progressive polyneuropathy    -  1      Care Instructions    1. Physical therapy referral.  2. No change in medications.  3. Return visit in 4 months.          Follow-ups after your visit        Additional Services     PHYSICAL THERAPY REFERRAL       *This therapy referral will be filtered to a centralized scheduling office at [facility] and the patient will receive a call to schedule an appointment at a [facility] location most convenient for them. *     [facility] provides Physical Therapy evaluation and treatment and many specialty services across the [facility] system.  If requesting a specialty program, please choose from the list below.    If you have not heard from the scheduling office within 2 business days, please call [phone] for all locations, with the exception of [place], please call [phone] and [facility], please call [phone]  Treatment: Evaluation & Treatment  Special Instructions/Modalities: gait, balance, and foot sensation/coordination  Special Programs: Balance/Vestibular    Please be aware that coverage of these services is subject to the terms and limitations of your health insurance plan.  Call member services at your health plan with any benefit or coverage questions.      **Note to Provider:  If you are referring outside of [facility] for the therapy appointment, please list the name of the location in the \"special instructions\" above, print the referral and give to the patient to schedule the appointment.                  Follow-up notes from your care team     Return in about 4 months (around 1/18/2019).      Your next 10 " appointments already scheduled     2019 11:30 AM CST   Return Visit with Sterling Rodriguez MD   [facility]    [address] [place] [address] [phone]              Who to contact     If you have questions or need follow up information about today's clinic visit or your schedule please contact [facility] directly at [phone].  Normal or non-critical lab and imaging results will be communicated to you by WomStreethart, letter or phone within 4 business days after the clinic has received the results. If you do not hear from us within 7 days, please contact the clinic through [employer] or phone. If you have a critical or abnormal lab result, we will notify you by phone as soon as possible.  Submit refill requests through [employer]ia or call your pharmacy and they will forward the refill request to us. Please allow 3 business days for your refill to be completed.          Additional Information About Your Visit        CorCardia Information     CorCardia is an electronic gateway that provides easy, online access to your medical records. With CorCardia, you can request a clinic appointment, read your test results, renew a prescription or communicate with your care team.     To sign up for Cor[employer]ia visit the website at [email or web address]   You will be asked to enter the access code listed below, as well as some personal information. Please follow the directions to create your username and password.     Your access code is: [email or web address]  Expires: 2018  1:00 PM     Your access code will  in 90 days. If you need help or a new code, please contact your [facility] or call [phone] for assistance.        Care EveryWhere ID     This is your Care EveryWhere ID. This could be used by other organizations to access your [place] medical records  [id number]        Your Vitals Were     Pulse Height Pulse  "Oximetry BMI (Body Mass Index)          65 1.854 m (6' 1\") 95% 26.39 kg/m2         Blood Pressure from Last 3 Encounters:   09/18/18 146/82   03/20/18 159/88   08/29/17 131/80    Weight from Last 3 Encounters:   09/18/18 90.7 kg (200 lb)   03/20/18 92.8 kg (204 lb 9.6 oz)   08/29/17 92.1 kg (203 lb)              We Performed the Following     PHYSICAL THERAPY REFERRAL        Primary Care Provider Office Phone # Fax #    Margaret Siegel [phone]       [facility] [address]  [facility] [address]        Equal Access to Services     MARYJO DIAZ : Hadii tevin worley hadasho Soomaali, waaxda luqadaha, qaybta kaalmada adeegyada, waxjyoti stark . So [facility] [phone].    ATENCIÓN: Si habla español, tiene a cheney disposición servicios gratuitos de asistencia lingüística. [facility] [phone].    We comply with applicable federal civil rights laws and [place] laws. We do not discriminate on the basis of race, color, national origin, age, disability, sex, sexual orientation, or gender identity.            Thank you!     Thank you for choosing [facility]  for your care. Our goal is always to provide you with excellent care. Hearing back from our patients is one way we can continue to improve our services. Please take a few minutes to complete the written survey that you may receive in the mail after your visit with us. Thank you!             Your Updated Medication List - Protect others around you: Learn how to safely use, store and throw away your medicines at www.disposemymeds.org.          This list is accurate as of 9/18/18  1:00 PM.  Always use your most recent med list.                   Brand Name Dispense Instructions for use Diagnosis    ascorbic acid 500 MG tablet    VITAMIN C     Take 500 mg by mouth daily.        EXCEDRIN PO      Take by mouth as needed        FISH OIL PO      Take 1 tablet by mouth daily. Capsules. Dosage not documented.        " gabapentin 300 MG capsule    NEURONTIN    540 capsule    TAKE TWO CAPSULES BY MOUTH THREE TIMES A DAY    Idiopathic progressive polyneuropathy       IBU-200 PO      Take 2 tablets by mouth as needed.        Lidocaine-Menthol 4-5 % Ptch      OTC prn        MULTIvitamin  S Caps      Take 1 tablet by mouth daily.        nortriptyline 25 MG capsule    PAMELOR    180 capsule    2 tablets at bedtime. Reduce dose and call if you experience dizziness, drowsiness, tremor, anxiety, or other new adverse effects.    Idiopathic progressive polyneuropathy       traMADol 50 MG tablet    ULTRAM    360 tablet    Take 1 tablet in the morning, 1 in the afternoon and 2 in the evening.    Neuropathic pain       vitamin E 400 UNIT capsule      Take 1 capsule by mouth daily.

## 2018-09-18 NOTE — LETTER
2018         RE: Angus Oliver  [address]  [place] [address]        Dear Colleague,    Thank you for referring your patient, Angus Oliver, to the [facility]. Please see a copy of my visit note below.    Return visit for 72 year old man with idiopathic polyneuropathy. No substantial change. Pain management acceptable. Occasionally catches his foot on stairs but no falls. Note dictated: [id number]    Mental state: Alert, appropriate, speech, language, and thought content normal.     Cranial nerves II-XII normal.    Sensory examination:   Right Left   Light touch Normal Normal   Vibration (timed) Malleolus 5 Patellla 5   Vibration (Rydell-Seiffer)     Pin Proximal calf Proximal calf   Position  Normal     Manual muscle testing (A indicates atrophy):   Right Left   Shoulder abduction:  5 5   Elbow extension: 5 5   Elbow flexion:  5 5   Wrist extension:  5 5   Finger extension 5 5   FDP 2 4+ 4+   FDI 4+ 5   APB 4 5   Hip flexion 5 5   Knee flexion 5 5   Knee extension 5 5   Dorsiflexion 5 5   Plantar flexion 5 5     Muscle tone:   Right Left   Upper limb Normal Trace cogwheel   Lower limb Normal Normal        Reflexes:   Right Left   Patellar 0 0   Achilles 0 0   Plantar Flexor Flexor   Clonus Absent Absent      Coordination:  Finger-nose normal.  Heel-shin normal.  RRMs normal.    Gait:  Normal. Reduced arm swing however. Posture normal. Sways with Romberg.    2018            Margaret Siegel MD   [address]  [address]      Patient:  Anugs Oliver   MRN:  [id number]   :  1945      Dear Dr. Siegel:         I saw Angus Oliver in followup at the [facility] where I have seen him for management of an idiopathic sensory motor polyneuropathy.  Mr. Oliver notices no change in his symptoms since he was seen last.  We did discuss his pain  management somewhat and he feels that he would like to make no change in his medication regimen.      Mr. Feldman did make note of the fact that on 1 or 2 occasions he feels as though he did not feel the accelerator when driving as well as he would have liked to.  He has not had any serious incidents, however, and has had no accidents and generally feels that his driving is quite safe.      The patient's examination is documented in the electronic medical record and is not substantially different than it had been in the past.  Note, however, that there is some decrease in arm swing.  He also reports some difficulty picking up small objects and I note mild weakness of the flexor digitorum profundus muscles, which may or may not be out of proportion to weakness elsewhere.  There is no weakness of the knee extensors.      I explained to Mr. Feldman that I could investigate his mild weakness in the hands further, but he declines this as there has been no functional deterioration.  I indicated clearly that he should not drive if he feels that he cannot do so safely.  I have arranged a physical therapy evaluation for gait and balance.  I would add that his neurologic examination of the right foot demonstrates preserved perception of vibration at the malleolus and preserved perception of light touch.  Typically these are sufficient to allow adequate tactile perception for driving, but he clearly understands not to drive if there is any concern about his safety.  Mr. Feldman will return in 4 months.         Sincerely,      STERLING CUADRA MD             D: 2018   T: 2018   MT: NTS      Name:     MANPREET FELDMAN   MRN:      [id number]        Account:      [id number]   :      1945      Document: [id number]       cc: Margaret Siegel MD       Again, thank you for allowing me to participate in the care of your patient.        Sincerely,        Sterling Cuadra MD

## 2018-09-18 NOTE — PROGRESS NOTES
Return visit for 72 year old man with idiopathic polyneuropathy. No substantial change. Pain management acceptable. Occasionally catches his foot on stairs but no falls. Note dictated: [id number]    Mental state: Alert, appropriate, speech, language, and thought content normal.     Cranial nerves II-XII normal.    Sensory examination:   Right Left   Light touch Normal Normal   Vibration (timed) Malleolus 5 Patellla 5   Vibration (Rydell-Seiffer)     Pin Proximal calf Proximal calf   Position  Normal     Manual muscle testing (A indicates atrophy):   Right Left   Shoulder abduction:  5 5   Elbow extension: 5 5   Elbow flexion:  5 5   Wrist extension:  5 5   Finger extension 5 5   FDP 2 4+ 4+   FDI 4+ 5   APB 4 5   Hip flexion 5 5   Knee flexion 5 5   Knee extension 5 5   Dorsiflexion 5 5   Plantar flexion 5 5     Muscle tone:   Right Left   Upper limb Normal Trace cogwheel   Lower limb Normal Normal        Reflexes:   Right Left   Patellar 0 0   Achilles 0 0   Plantar Flexor Flexor   Clonus Absent Absent      Coordination:  Finger-nose normal.  Heel-shin normal.  RRMs normal.    Gait:  Normal. Reduced arm swing however. Posture normal. Sways with Romberg.

## 2018-09-18 NOTE — NURSING NOTE
"Angus Oliver's goals for this visit include:   Chief Complaint   Patient presents with     RECHECK     6 month follow up     He requests these members of his care team be copied on today's visit information: PCP    PCP: Margaret Siegel    Referring Provider:  No referring provider defined for this encounter.    /82 (BP Location: Left arm, Patient Position: Sitting, Cuff Size: Adult Regular)  Pulse 65  Ht 1.854 m (6' 1\")  Wt 90.7 kg (200 lb)  SpO2 95%  BMI 26.39 kg/m2    Do you need any medication refills at today's visit? N  "

## 2018-09-19 NOTE — PROGRESS NOTES
2018            [place] [address]      Patient:  Angus Oliver   MRN:  [id number]   :  1945      Dear Dr. Siegel:         I saw Angus Oliver in followup at the [facility] where I have seen him for management of an idiopathic sensory motor polyneuropathy.  Mr. Oliver notices no change in his symptoms since he was seen last.  We did discuss his pain management somewhat and he feels that he would like to make no change in his medication regimen.      Mr. Oliver did make note of the fact that on 1 or 2 occasions he feels as though he did not feel the accelerator when driving as well as he would have liked to.  He has not had any serious incidents, however, and has had no accidents and generally feels that his driving is quite safe.      The patient's examination is documented in the electronic medical record and is not substantially different than it had been in the past.  Note, however, that there is some decrease in arm swing.  He also reports some difficulty picking up small objects and I note mild weakness of the flexor digitorum profundus muscles, which may or may not be out of proportion to weakness elsewhere.  There is no weakness of the knee extensors.      I explained to Mr. Oliver that I could investigate his mild weakness in the hands further, but he declines this as there has been no functional deterioration.  I indicated clearly that he should not drive if he feels that he cannot do so safely.  I have arranged a physical therapy evaluation for gait and balance.  I would add that his neurologic examination of the right foot demonstrates preserved perception of vibration at the malleolus and preserved perception of light touch.  Typically these are sufficient to allow adequate tactile perception for driving, but he clearly understands not to drive  if there is any concern about his safety.  Mr. Oliver will return in 4 months.         Sincerely,      MARIBELL CUADRA MD             D: 2018   T: 2018   MT: WAN      Name:     MANPREET OLIVER   MRN:      [id number]        Account:      [id number]   :      1945      Document: [id number]       cc: Margaret Siegel MD

## 2018-09-25 ENCOUNTER — HOSPITAL ENCOUNTER (OUTPATIENT)
Dept: PHYSICAL THERAPY | Facility: CLINIC | Age: 73
Setting detail: THERAPIES SERIES
End: 2018-09-25
Attending: PSYCHIATRY & NEUROLOGY
Payer: MEDICARE

## 2018-09-25 PROCEDURE — 97161 PT EVAL LOW COMPLEX 20 MIN: CPT | Mod: GP | Performed by: PHYSICAL THERAPIST

## 2018-09-25 PROCEDURE — G8978 MOBILITY CURRENT STATUS: HCPCS | Mod: GP | Performed by: PHYSICAL THERAPIST

## 2018-09-25 PROCEDURE — 97112 NEUROMUSCULAR REEDUCATION: CPT | Mod: GP | Performed by: PHYSICAL THERAPIST

## 2018-09-25 PROCEDURE — G8979 MOBILITY GOAL STATUS: HCPCS | Mod: GP,CI | Performed by: PHYSICAL THERAPIST

## 2018-09-25 PROCEDURE — 40000718 ZZHC STATISTIC PT DEPARTMENT ORTHO VISIT: Performed by: PHYSICAL THERAPIST

## 2018-09-25 NOTE — PROGRESS NOTES
Angus Oliver   PHYSICAL THERAPY EVALUATION    09/25/18 1400   Quick Adds   Quick Adds Vestibular Eval;Certification   Type of Visit Initial OP PT Evaluation   General Information   Start of Care Date 09/25/18   Referring Physician DR. Rodriguez   Orders Evaluate and Treat as Indicated   Order Date 09/18/18   Onset of illness/injury or Date of Surgery 09/18/18   Surgical/Medical history reviewed Yes  (prostrate CA)   Pertinent history of current problem (include personal factors and/or comorbidities that impact the POC) B ideopathic neuropathy , progressing over 10 years, slowly getting worse.  More numb and painful., has been up to knee on occas.  Takes gabapentin, tramadol, noretryptiline.  Difficulty walking in the dark, has night lights, needs to touch wall.   Prior level of function comment [place], outdoor tasks, cut trees, log splitting, works on cars   Patient role/Employment history Retired   Living environment [facility]   Patient/Family Goals Statement evaluate balance, be able to walk in the dark better   Fall Risk Screen   Fall screen completed by PT   Have you fallen 2 or more times in the past year? No   Have you fallen and had an injury in the past year? No   Is patient a fall risk? No   Posture   Posture Comments slightly forward bent   Gait   Gait Comments gait is normal, gait with horiz head turns with path deviation, vertical with less path devation   Gait Special Tests   Gait Special Tests DYNAMIC GAIT INDEX;25 FOOT TIMED WALK   Gait Special Tests 25 Foot Timed Walk   Seconds 7.25   Steps 12 Steps   Gait Special Tests Dynamic Gait Index   Score out of 24 20   Balance   Balance Comments standing FT EO with horiz head turns x10 mod sway, near LOB, FT EO vertical head turns x10 mod/severe sway, near fall   Balance Special Tests   Balance Special Tests Sit to stand reps;Modified CTSIB Conditions   Balance Special Tests Modified CTSIB Conditions   Condition 1, seconds 30 Seconds  "  Condition 2, seconds 15 Seconds  (with FA 6\" 30sec mod sway)   Condition 4, seconds 30 Seconds  (FA 6\")   Condition 5, seconds 25 Seconds  (FA 6\" mod sway)   Balance Special Tests Sit to Stand Reps in 30 Seconds   Reps in 30 seconds 14   Height 19   Sensory Examination   Sensory Perception Comments unable to detect 5.07/10g filament at L plantar surface gr toe, 3 and 5th toes, able to detect at all other aspects of foot, ankle, able to detect light touch B LE and feet, proprioception/position sense of gr toe is intact   Oculomotor Exam   VOR Normal   VOR Cancellation Normal   VOR Cancellation Comments mild sway   Planned Therapy Interventions   Planned Therapy Interventions strengthening;balance training;neuromuscular re-education   Clinical Impression   Criteria for Skilled Therapeutic Interventions Met yes, treatment indicated   PT Diagnosis balance deficits   Functional limitations due to impairments walking in the dark, walking when not looking straight ahead   Clinical Presentation Stable/Uncomplicated   Clinical Decision Making (Complexity) Low complexity   Therapy Frequency 1 time/week   Predicted Duration of Therapy Intervention (days/wks) see  3weeks apart, 2-3 total   Risk & Benefits of therapy have been explained Yes   Patient, Family & other staff in agreement with plan of care Yes   Education Assessment   Preferred Learning Style Listening;Pictures/video   Barriers to Learning No barriers   GOALS   PT Eval Goals 1;2   Goal 1   Goal Description pt will be confident wlaking in dark to bathroom in 6wk   Target Date 11/06/18   Goal 2   Goal Description indep in home balance program for fall prevention   Target Date 11/06/18   Total Evaluation Time   Total Evaluation Time (Minutes) 20   Therapy Certification   Certification date from 09/25/18   Certification date to 11/06/18   Medical Diagnosis B ideopathic neuropathy   Kris Hoenk, PT #[id number]  [facility]    "

## 2018-10-15 ENCOUNTER — HOSPITAL ENCOUNTER (OUTPATIENT)
Dept: PHYSICAL THERAPY | Facility: CLINIC | Age: 73
Setting detail: THERAPIES SERIES
End: 2018-10-15
Attending: PSYCHIATRY & NEUROLOGY
Payer: MEDICARE

## 2018-10-15 PROCEDURE — G8980 MOBILITY D/C STATUS: HCPCS | Mod: GP,CI | Performed by: PHYSICAL THERAPIST

## 2018-10-15 PROCEDURE — 97112 NEUROMUSCULAR REEDUCATION: CPT | Mod: GP | Performed by: PHYSICAL THERAPIST

## 2018-10-15 PROCEDURE — G8979 MOBILITY GOAL STATUS: HCPCS | Mod: GP,CI | Performed by: PHYSICAL THERAPIST

## 2018-10-15 PROCEDURE — 40000718 ZZHC STATISTIC PT DEPARTMENT ORTHO VISIT: Performed by: PHYSICAL THERAPIST

## 2018-10-15 NOTE — PROGRESS NOTES
"  Angus LUND Benito   PHYSICAL THERAPY DISCHARGE  10/15/18 1100   Signing Clinician's Name / Credentials   Signing clinician's name / credentials Kris Hoenk, PT   Session Number   Session Number 2 MC, seen 9/25/18 and 10/15/18   PT Medicare Only G-code   G-code Mobility: Walking & Moving Around   Mobility: Walking & Moving Around   Mobility Goal,  (eval/re-eval, every progress note, & discharge) CI: 1-19% impairment   Mobility Discharge Status,  (discharge) CI: 1-19% impairment   Discharge Mobility Modifier Rationale neuropathies, high level balance deficits, improved gait with head turns and balance with head turns   Adult Goals   PT Eval Goals 1;2   Goal 1   Goal Description pt will be confident wlaking in dark to bathroom in 6wk  (better)   Target Date 11/06/18   Goal 2   Goal Description indep in home balance program for fall prevention   Target Date 11/06/18   Date Met 10/15/18   Subjective Report   Subjective Report thinks he is doing better, feels he can stand with eyes closed better than he could, exercises have helped   Objective Measures   Objective Measures Objective Measure 1   Objective Measure 1   Details see treatment detail for balance   Neuromuscular Re-education   Minutes 24   Skilled Intervention balance and strength ex to improve balance, stability   Patient Response needs FA for EC positions, no LOB or path deviation on gait today   Treatment Detail balance FT EO in place 30 sec, FT EC x15 sec, FA 6\" EC 30 sec, FT EO horiz and vert head turns , FA 6\"  EC horiz head turns x5, sit to stnad in 30 sec =14, foam FA 6\" EO 30sec, FA 6\" EC x15sec, gait with horioz head turns 2x 30ft, gait vertical turns x30ft, cont these at home -6\" lateral step up x10 B, B heel raises x1)   Plan   Plan for next session will continue at home with balance program, goals met as above, pt agreeable with plan   Total Session Time   Timed Code Treatment Minutes 24   Total Treatment Time (sum of timed and untimed " services) 24   Kris Hoenk, PT #[id number]  [facility]

## 2018-10-17 ENCOUNTER — TELEPHONE (OUTPATIENT)
Dept: NEUROLOGY | Facility: CLINIC | Age: 73
End: 2018-10-17

## 2018-10-17 NOTE — TELEPHONE ENCOUNTER
[facility] CLINICAL DOCUMENTATION    Form Documentation Form or Letter Request    Type or form/letter needing completion: PT-chito Chamberlain  Provider: Jennifer  Has provider seen patient for office visit related to reason for form request? Yes  Date form needed: ASAP  Once completed: Fax form to: [phone]     Placed in Dr. Rodriguez folder for signature    Cookie Little LPN

## 2018-11-02 ENCOUNTER — TELEPHONE (OUTPATIENT)
Dept: NEUROLOGY | Facility: CLINIC | Age: 73
End: 2018-11-02

## 2018-11-29 DIAGNOSIS — G60.3 IDIOPATHIC PROGRESSIVE POLYNEUROPATHY: ICD-10-CM

## 2018-11-29 RX ORDER — NORTRIPTYLINE HCL 25 MG
CAPSULE ORAL
Qty: 180 CAPSULE | Refills: 1 | Status: SHIPPED | OUTPATIENT
Start: 2018-11-29

## 2018-11-29 NOTE — TELEPHONE ENCOUNTER
Writer received a refill request from: [facility] in [facility].     Pending Prescriptions:                       Disp   Refills    nortriptyline (PAMELOR) 25 MG capsule     180 ca*1            Si tablets at bedtime. Reduce dose and call if you           experience dizziness, drowsiness, tremor,           anxiety, or other new adverse effects.      Pt's last office visit: 18  Next scheduled office visit: 12/3/18      Cookie Little LPN

## 2018-12-03 ENCOUNTER — OFFICE VISIT (OUTPATIENT)
Dept: NEUROLOGY | Facility: CLINIC | Age: 73
End: 2018-12-03
Payer: COMMERCIAL

## 2018-12-03 VITALS
HEART RATE: 66 BPM | SYSTOLIC BLOOD PRESSURE: 137 MMHG | HEIGHT: 73 IN | DIASTOLIC BLOOD PRESSURE: 81 MMHG | WEIGHT: 203 LBS | OXYGEN SATURATION: 97 % | BODY MASS INDEX: 26.9 KG/M2

## 2018-12-03 DIAGNOSIS — G60.3 IDIOPATHIC PROGRESSIVE POLYNEUROPATHY: Primary | ICD-10-CM

## 2018-12-03 PROCEDURE — 99213 OFFICE O/P EST LOW 20 MIN: CPT | Performed by: PSYCHIATRY & NEUROLOGY

## 2018-12-03 ASSESSMENT — PAIN SCALES - GENERAL: PAINLEVEL: MILD PAIN (3)

## 2018-12-03 NOTE — PROGRESS NOTES
No change in symptoms since last visit. Had PT evaluation and was given exercises, which may have helped. He does notice difficulty grasping objects due to sensory deficits in thumb and index finger. Denies exacerbation of symptoms with activity or sleep, denies wrist pain.     Will be changing insurance and likely will change to [facility].      Mental state: Alert, appropriate, speech, language, and thought content normal.     Cranial nerves II-XII normal.    Sensory examination:   Right Left   Light touch Normal Normal   Vibration (timed)     Vibration (Rydell-Seiffer) K5 K5   Pin Right median  Normal   Position     Legend:   MM = medial malleolus, TT = tibial tuberosity, K = patella, MCP = MCP joint  MF = mid-foot, DC = distal calf, MC = mid calf, PC = proximal calf    Negative Tinel's at wrist.      Manual muscle testing (A indicates atrophy):   Right Left   Shoulder abduction  5 5   Elbow extension 5 5   Elbow flexion 5 5   Wrist extension  5 5   Finger extension 5 5   FDI 5 5   APB 4 4   Hip flexion 5 5   Knee flexion 5 5   Knee extension 5 5   Dorsiflexion 5 5   Plantar flexion 5 5     Muscle tone:   Right Left   Upper limb Normal Normal   Lower limb Normal Normal        Reflexes:   Right Left   Patellar 0 0   Achilles 0 0   Plantar Flexor Flexor   Clonus Absent Absent        Gait:  Normal.    Impression:  1. Idiopathic progressive polyneuropathy - vibration worse right foot, LT and pin improved - overall no compelling change.  2. 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      Recommendations:  NCS hands for possible CTS. I offered testing and [facility]    No other changes.    RTC prn.    Sterling Rodriguez M.D.

## 2018-12-03 NOTE — NURSING NOTE
"Angus Oliver's goals for this visit include:    Chief Complaint   Patient presents with     RECHECK     He requests these members of his care team be copied on today's visit information: PCP    PCP: Margaret Siegel    Referring Provider:  No referring provider defined for this encounter.    /81 (BP Location: Left arm, Patient Position: Sitting, Cuff Size: Adult Large)  Pulse 66  Ht 1.854 m (6' 1\")  Wt 92.1 kg (203 lb)  SpO2 97%  BMI 26.78 kg/m2    Do you need any medication refills at today's visit?  N  "

## 2018-12-03 NOTE — LETTER
12/3/2018         RE: Angus Oliver  [address]  [place] [address]        Dear Colleague,    Thank you for referring your patient, Angus Oliver, to the [facility]. Please see a copy of my visit note below.    No change in symptoms since last visit. Had PT evaluation and was given exercises, which may have helped. He does notice difficulty grasping objects due to sensory deficits in thumb and index finger. Denies exacerbation of symptoms with activity or sleep, denies wrist pain.     Will be changing insurance and likely will change to [facility].      Mental state: Alert, appropriate, speech, language, and thought content normal.     Cranial nerves II-XII normal.    Sensory examination:   Right Left   Light touch Normal Normal   Vibration (timed)     Vibration (Rydell-Seiffer) K5 K5   Pin Right median  Normal   Position     Legend:   MM = medial malleolus, TT = tibial tuberosity, K = patella, MCP = MCP joint  MF = mid-foot, DC = distal calf, MC = mid calf, PC = proximal calf    Negative Tinel's at wrist.      Manual muscle testing (A indicates atrophy):   Right Left   Shoulder abduction  5 5   Elbow extension 5 5   Elbow flexion 5 5   Wrist extension  5 5   Finger extension 5 5   FDI 5 5   APB 4 4   Hip flexion 5 5   Knee flexion 5 5   Knee extension 5 5   Dorsiflexion 5 5   Plantar flexion 5 5     Muscle tone:   Right Left   Upper limb Normal Normal   Lower limb Normal Normal        Reflexes:   Right Left   Patellar 0 0   Achilles 0 0   Plantar Flexor Flexor   Clonus Absent Absent        Gait:  Normal.    Impression:  1. Idiopathic progressive polyneuropathy - vibration worse right foot, LT and pin improved - overall no compelling change.  2. 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      Recommendations:  NCS hands for possible CTS. I offered testing and he may proceed with this at [facility].    No other changes.    RTC prn.    Sterling Rodriguez,  M.D.        Again, thank you for allowing me to participate in the care of your patient.        Sincerely,        Sterling Rodriguez MD

## 2018-12-03 NOTE — MR AVS SNAPSHOT
After Visit Summary   12/3/2018    Angus Oliver    MRN: [id number]           Patient Information     Date Of Birth          1945        Visit Information        Provider Department      12/3/2018 8:30 AM Sterling Rodriguez MD [facility]        Today's Diagnoses     Idiopathic progressive polyneuropathy    -  1       Follow-ups after your visit        Who to contact     If you have questions or need follow up information about today's clinic visit or your schedule please contact [facility] directly at [phone].  Normal or non-critical lab and imaging results will be communicated to you by MyChart, letter or phone within 4 business days after the clinic has received the results. If you do not hear from us within 7 days, please contact the clinic through [facility] or phone. If you have a critical or abnormal lab result, we will notify you by phone as soon as possible.  Submit refill requests through Drik or call your pharmacy and they will forward the refill request to us. Please allow 3 business days for your refill to be completed.          Additional Information About Your Visit        MyChart Information     Drik is an electronic gateway that provides easy, online access to your medical records. With Drik, you can request a clinic appointment, read your test results, renew a prescription or communicate with your care team.     To sign up for Drik visit the website at [email or web address]   You will be asked to enter the access code listed below, as well as some personal information. Please follow the directions to create your username and password.     Your access code is: [email or web address]  Expires: 2018 12:00 PM     Your access code will  in 90 days. If you need help or a new code, please contact your [facility] or call [phone] for assistance.        Care EveryWhere ID     This is your Care  "EveryWhere ID. This could be used by other organizations to access your [place] medical records  [id number]        Your Vitals Were     Pulse Height Pulse Oximetry BMI (Body Mass Index)          66 1.854 m (6' 1\") 97% 26.78 kg/m2         Blood Pressure from Last 3 Encounters:   12/03/18 137/81   09/18/18 146/82   03/20/18 159/88    Weight from Last 3 Encounters:   12/03/18 92.1 kg (203 lb)   09/18/18 90.7 kg (200 lb)   03/20/18 92.8 kg (204 lb 9.6 oz)              Today, you had the following     No orders found for display       Primary Care Provider Office Phone # Fax #    Margaret MCCOY [place] [phone]       [facility] [address]  [facility] [address]        Equal Access to Services     [facility]: Hadii aad ku hadasho Soomaali, waaxda luqadaha, qaybta kaalmada adeegyada, waxay hannahin hayaaandrey stark . So [place] -[phone].    ATENCIÓN: Si habla español, tiene a cheney disposición servicios gratuitos de asistencia lingüística. Llame al [phone].    We comply with applicable federal civil rights laws and [place] laws. We do not discriminate on the basis of race, color, national origin, age, disability, sex, sexual orientation, or gender identity.            Thank you!     Thank you for choosing [facility]  for your care. Our goal is always to provide you with excellent care. Hearing back from our patients is one way we can continue to improve our services. Please take a few minutes to complete the written survey that you may receive in the mail after your visit with us. Thank you!             Your Updated Medication List - Protect others around you: Learn how to safely use, store and throw away your medicines at www.disposemymeds.org.          This list is accurate as of 12/3/18  5:57 PM.  Always use your most recent med list.                   Brand Name Dispense Instructions for use Diagnosis    EXCEDRIN PO      Take by mouth as needed        FISH " OIL PO      Take 1 tablet by mouth daily. Capsules. Dosage not documented.        gabapentin 300 MG capsule    NEURONTIN    540 capsule    TAKE TWO CAPSULES BY MOUTH THREE TIMES A DAY    Idiopathic progressive polyneuropathy       IBU-200 PO      Take 2 tablets by mouth as needed.        Lidocaine-Menthol 4-5 % Ptch      OTC prn        multivitamin capsule      Take 1 tablet by mouth daily.        nortriptyline 25 MG capsule    PAMELOR    180 capsule    2 tablets at bedtime. Reduce dose and call if you experience dizziness, drowsiness, tremor, anxiety, or other new adverse effects.    Idiopathic progressive polyneuropathy       traMADol 50 MG tablet    ULTRAM    360 tablet    Take 1 tablet in the morning, 1 in the afternoon and 2 in the evening.    Neuropathic pain       vitamin C 500 MG tablet    ASCORBIC ACID     Take 500 mg by mouth daily.        vitamin E 400 units (180 mg) capsule    TOCOPHEROL     Take 1 capsule by mouth daily.

## 2019-02-13 DIAGNOSIS — G60.3 IDIOPATHIC PROGRESSIVE POLYNEUROPATHY: ICD-10-CM

## 2019-02-13 RX ORDER — GABAPENTIN 300 MG/1
CAPSULE ORAL
Qty: 540 CAPSULE | Refills: 0 | Status: SHIPPED | OUTPATIENT
Start: 2019-02-13

## 2019-02-13 NOTE — TELEPHONE ENCOUNTER
[facility] Call Center    Phone Message    May a detailed message be left on voicemail: yes    Reason for Call: Other:  Due to patients insurance being out of network with Dr. Rodriguez, patient is calling requesting referral [facility] . Please send referal to Fax: [phone].      Medication Request:  Patient is requesting additional 90 day refill for Gabapentin 300 MG when he runs out of his last supply in 40 days. Please advise.  Pharm[facility] [place] [facility] [address]  [place] [address]         Action Taken: Message routed to:  Adult Clinics: Neurology p [phone]

## 2019-02-13 NOTE — TELEPHONE ENCOUNTER
Spoke with Dr. Rodriguez who approved referral and also gabapentin supply until pt could switch providers. Will inform pt and send referral to requested provider. Stephanie Thornton RN